# Patient Record
Sex: MALE | Race: BLACK OR AFRICAN AMERICAN | Employment: UNEMPLOYED | URBAN - METROPOLITAN AREA
[De-identification: names, ages, dates, MRNs, and addresses within clinical notes are randomized per-mention and may not be internally consistent; named-entity substitution may affect disease eponyms.]

---

## 2022-04-05 ENCOUNTER — OFFICE VISIT (OUTPATIENT)
Dept: URGENT CARE | Facility: URGENT CARE | Age: 32
End: 2022-04-05
Payer: COMMERCIAL

## 2022-04-05 VITALS
OXYGEN SATURATION: 100 % | TEMPERATURE: 99 F | WEIGHT: 164.4 LBS | SYSTOLIC BLOOD PRESSURE: 119 MMHG | BODY MASS INDEX: 24.35 KG/M2 | DIASTOLIC BLOOD PRESSURE: 80 MMHG | HEART RATE: 77 BPM | HEIGHT: 69 IN

## 2022-04-05 DIAGNOSIS — K04.7 TOOTH ABSCESS: Primary | ICD-10-CM

## 2022-04-05 PROCEDURE — 99203 OFFICE O/P NEW LOW 30 MIN: CPT | Performed by: NURSE PRACTITIONER

## 2022-04-05 RX ORDER — ACETAMINOPHEN 500 MG
500-1000 TABLET ORAL
COMMUNITY
Start: 2020-08-20

## 2022-04-05 NOTE — PATIENT INSTRUCTIONS
Patient Education     Dental Abscess with Facial Cellulitis     A dental abscess is an infection at the base of a tooth. It means a pocket of fluid (pus) has formed at the tip of a tooth root in your jawbone. If the infection isn t treated, more serious infections may spread to the face (facial cellulitis). This makes your face swell. Facial cellulitis is an infection of the skin and underlying soft tissues. This is a very serious condition. Once the infection and swelling starts, it can spread quickly.  A dental abscess often starts with a crack or cavity in a tooth. The pain is often made worse by having hot or cold drinks, or biting on hard foods. The pain may spread from the tooth to your ear, or to the area of your jaw on the same side.  Home care  Follow these tips when caring for yourself at home:    Don't have hot and cold foods and drinks. Your tooth may be sensitive to changes in temperature. Don t chew on the side of the infected tooth.    If your tooth is chipped or cracked, or if there is a large open cavity, put clove oil right on the tooth to ease pain. You can buy clove oil at pharmacies. Some pharmacies carry an over-the-counter toothache kit. This has a paste that you can put on the exposed tooth to make it less sensitive.    Put a cold pack on your jaw over the sore area. This can help reduce pain.    You may use over-the-counter medicine to ease pain, unless another medicine was prescribed. Talk with your provider before using acetaminophen or ibuprofen if you have chronic liver or kidney disease. Also talk with your provider if you ve had a stomach ulcer or GI (gastrointestinal) bleeding.    An antibiotic will be prescribed. Take it exactly as directed. Don t miss any doses.  Follow-up care  Follow up with your dentist or an oral surgeon, as advised. Severe cases of cellulitis must be checked again in 24 hours. Once a tooth infection occurs, it will be a problem until the infection is drained.  This is done through surgery or a root canal. Or you may need to have your tooth pulled.  Call 911  Call 911 if any of these occur:    Swelling spreads to the upper half of your face or neck    Your eyelids start to swell shut    Abnormal drowsiness    Headache or a stiff neck    Weakness or fainting    Trouble swallowing or breathing  When to get medical advice  Call your healthcare provider right away if any of these occur:    Pain gets worse or spreads to your neck    Fever of 100.4 F (38 C) or higher, or as directed by your provider  Vaishali last reviewed this educational content on 12/1/2019 2000-2021 [employer]. All rights reserved. This information is not intended as a substitute for professional medical care. Always follow your healthcare professional's instructions.         please monitor symptoms closely if symptoms of fever, increased pain redness and warmth occur recommend you be seen in emergency as this may be indication of sepsis (blood infection)

## 2022-04-05 NOTE — PROGRESS NOTES
Assessment & Plan     1. Tooth abscess  Discussed home care plan. See avs. Start oral antibiotic, rinse mouth with warm salt water 3 times per day. Monitor symptoms for sepsis if occur will go to ER.   Prescribed with augmentin  Side effects discussed warned about GI side effects and risk of cdiff.    Has appointment tomorrow with dentist.     - amoxicillin-clavulanate (AUGMENTIN) 875-125 MG tablet; Take 1 tablet by mouth 2 times daily for 10 days  Dispense: 20 tablet; Refill: 0    Patient Instructions     Patient Education     Dental Abscess with Facial Cellulitis     A dental abscess is an infection at the base of a tooth. It means a pocket of fluid (pus) has formed at the tip of a tooth root in your jawbone. If the infection isn t treated, more serious infections may spread to the face (facial cellulitis). This makes your face swell. Facial cellulitis is an infection of the skin and underlying soft tissues. This is a very serious condition. Once the infection and swelling starts, it can spread quickly.  A dental abscess often starts with a crack or cavity in a tooth. The pain is often made worse by having hot or cold drinks, or biting on hard foods. The pain may spread from the tooth to your ear, or to the area of your jaw on the same side.  Home care  Follow these tips when caring for yourself at home:    Don't have hot and cold foods and drinks. Your tooth may be sensitive to changes in temperature. Don t chew on the side of the infected tooth.    If your tooth is chipped or cracked, or if there is a large open cavity, put clove oil right on the tooth to ease pain. You can buy clove oil at pharmacies. Some pharmacies carry an over-the-counter toothache kit. This has a paste that you can put on the exposed tooth to make it less sensitive.    Put a cold pack on your jaw over the sore area. This can help reduce pain.    You may use over-the-counter medicine to ease pain, unless another medicine was prescribed.  Talk with your provider before using acetaminophen or ibuprofen if you have chronic liver or kidney disease. Also talk with your provider if you ve had a stomach ulcer or GI (gastrointestinal) bleeding.    An antibiotic will be prescribed. Take it exactly as directed. Don t miss any doses.  Follow-up care  Follow up with your dentist or an oral surgeon, as advised. Severe cases of cellulitis must be checked again in 24 hours. Once a tooth infection occurs, it will be a problem until the infection is drained. This is done through surgery or a root canal. Or you may need to have your tooth pulled.  Call 911  Call 911 if any of these occur:    Swelling spreads to the upper half of your face or neck    Your eyelids start to swell shut    Abnormal drowsiness    Headache or a stiff neck    Weakness or fainting    Trouble swallowing or breathing  When to get medical advice  Call your healthcare provider right away if any of these occur:    Pain gets worse or spreads to your neck    Fever of 100.4 F (38 C) or higher, or as directed by your provider  Vaishali last reviewed this educational content on 12/1/2019 2000-2021 [employer]. All rights reserved. This information is not intended as a substitute for professional medical care. Always follow your healthcare professional's instructions.         please monitor symptoms closely if symptoms of fever, increased pain redness and warmth occur recommend you be seen in emergency as this may be indication of sepsis (blood infection)              JAIME [facility] 31 [facility] who presents to clinic today for the following health issues:  Chief Complaint   Patient presents with     Abscess     HPI     Tooth pain Adult    Onset of symptoms was 2 day(s) ago.  Course of illness is worsening.    Severity moderate  Current and Associated symptoms: pain right back molar on right  "upper  Treatment measures tried include Tylenol/Ibuprofen.  Predisposing factors include patient states he had dental work done with a filling placed 3 days ago but 24 hours after this he started to have pain and swelling in the area of dental work.  Patient states that he notified his dentist of this who instructed him to come to the urgent care to be seen would likely need antibiotics.  Patient states pain is approximately 5 out of 10 he has a low-grade fever has not had Tylenol today.  He has been tolerating fluids denies nausea..        Review of Systems  Constitutional, HEENT, cardiovascular, pulmonary, gi and gu systems are negative, except as otherwise noted.      Objective    /80 (BP Location: Left arm, Patient Position: Sitting, Cuff Size: Adult Regular)   Pulse 77   Temp 99  F (37.2  C) (Tympanic)   Ht 1.753 m (5' 9\")   Wt 74.6 kg (164 lb 6.4 oz)   SpO2 100%   BMI 24.28 kg/m    Physical Exam   GENERAL: alert and mild distress  HENT: normal cephalic/atraumatic, ear canals and TM's normal, nose and mouth without ulcers or lesions, oropharynx clear, oral mucous membranes moist and right face swelling and tenderness, right upper back molar appears to have inflammation around the tooth and pain with palpation in this area negative for drainage area is slightly warm and lymph nodes on the side are swollen and slightly tender.  NECK: no adenopathy, no asymmetry, masses, or scars and thyroid normal to palpation  RESP: lungs clear to auscultation - no rales, rhonchi or wheezes  CV: regular rate and rhythm, normal S1 S2, no S3 or S4, no murmur, click or rub, no peripheral edema and peripheral pulses strong          "